# Patient Record
Sex: FEMALE | Race: WHITE | Employment: FULL TIME | URBAN - METROPOLITAN AREA
[De-identification: names, ages, dates, MRNs, and addresses within clinical notes are randomized per-mention and may not be internally consistent; named-entity substitution may affect disease eponyms.]

---

## 2021-11-07 ENCOUNTER — HOSPITAL ENCOUNTER (EMERGENCY)
Age: 26
Discharge: HOME | End: 2021-11-08
Attending: STUDENT IN AN ORGANIZED HEALTH CARE EDUCATION/TRAINING PROGRAM
Payer: COMMERCIAL

## 2021-11-07 ENCOUNTER — APPOINTMENT (OUTPATIENT)
Dept: CT IMAGING | Age: 26
End: 2021-11-07
Payer: COMMERCIAL

## 2021-11-07 DIAGNOSIS — M62.838 SPASM OF MUSCLE: Primary | ICD-10-CM

## 2021-11-07 LAB
ALBUMIN SERPL-MCNC: 4.7 GM/DL (ref 3.4–5)
ALP BLD-CCNC: 55 IU/L (ref 40–129)
ALT SERPL-CCNC: 18 U/L (ref 10–40)
ANION GAP SERPL CALCULATED.3IONS-SCNC: 11 MMOL/L (ref 4–16)
AST SERPL-CCNC: 14 IU/L (ref 15–37)
BACTERIA: ABNORMAL /HPF
BASOPHILS ABSOLUTE: 0 K/CU MM
BASOPHILS RELATIVE PERCENT: 0.4 % (ref 0–1)
BILIRUB SERPL-MCNC: 0.4 MG/DL (ref 0–1)
BILIRUBIN URINE: NEGATIVE MG/DL
BLOOD, URINE: NEGATIVE
BUN BLDV-MCNC: 14 MG/DL (ref 6–23)
CALCIUM SERPL-MCNC: 9.1 MG/DL (ref 8.3–10.6)
CHLORIDE BLD-SCNC: 104 MMOL/L (ref 99–110)
CLARITY: CLEAR
CO2: 24 MMOL/L (ref 21–32)
COLOR: YELLOW
CREAT SERPL-MCNC: 0.7 MG/DL (ref 0.6–1.1)
DIFFERENTIAL TYPE: ABNORMAL
EOSINOPHILS ABSOLUTE: 0.1 K/CU MM
EOSINOPHILS RELATIVE PERCENT: 1.1 % (ref 0–3)
GFR AFRICAN AMERICAN: >60 ML/MIN/1.73M2
GFR NON-AFRICAN AMERICAN: >60 ML/MIN/1.73M2
GLUCOSE BLD-MCNC: 120 MG/DL (ref 70–99)
GLUCOSE, URINE: NEGATIVE MG/DL
HCG QUALITATIVE: NEGATIVE
HCT VFR BLD CALC: 38.5 % (ref 37–47)
HEMOGLOBIN: 12.5 GM/DL (ref 12.5–16)
HYALINE CASTS: 0 /LPF
IMMATURE NEUTROPHIL %: 0.3 % (ref 0–0.43)
KETONES, URINE: NEGATIVE MG/DL
LEUKOCYTE ESTERASE, URINE: NEGATIVE
LIPASE: 31 IU/L (ref 13–60)
LYMPHOCYTES ABSOLUTE: 3.1 K/CU MM
LYMPHOCYTES RELATIVE PERCENT: 32.1 % (ref 24–44)
MCH RBC QN AUTO: 28.5 PG (ref 27–31)
MCHC RBC AUTO-ENTMCNC: 32.5 % (ref 32–36)
MCV RBC AUTO: 87.7 FL (ref 78–100)
MONOCYTES ABSOLUTE: 0.6 K/CU MM
MONOCYTES RELATIVE PERCENT: 6.3 % (ref 0–4)
MUCUS: ABNORMAL HPF
NITRITE URINE, QUANTITATIVE: NEGATIVE
NUCLEATED RBC %: 0 %
PDW BLD-RTO: 11.9 % (ref 11.7–14.9)
PH, URINE: 6 (ref 5–8)
PLATELET # BLD: 262 K/CU MM (ref 140–440)
PMV BLD AUTO: 9.2 FL (ref 7.5–11.1)
POTASSIUM SERPL-SCNC: 3.7 MMOL/L (ref 3.5–5.1)
PROTEIN UA: NEGATIVE MG/DL
RBC # BLD: 4.39 M/CU MM (ref 4.2–5.4)
RBC URINE: ABNORMAL /HPF (ref 0–6)
SEGMENTED NEUTROPHILS ABSOLUTE COUNT: 5.8 K/CU MM
SEGMENTED NEUTROPHILS RELATIVE PERCENT: 59.8 % (ref 36–66)
SODIUM BLD-SCNC: 139 MMOL/L (ref 135–145)
SPECIFIC GRAVITY UA: 1.01 (ref 1–1.03)
SQUAMOUS EPITHELIAL: 2 /HPF
TOTAL IMMATURE NEUTOROPHIL: 0.03 K/CU MM
TOTAL NUCLEATED RBC: 0 K/CU MM
TOTAL PROTEIN: 7.3 GM/DL (ref 6.4–8.2)
TRICHOMONAS: ABNORMAL /HPF
UROBILINOGEN, URINE: NEGATIVE MG/DL (ref 0.2–1)
WBC # BLD: 9.7 K/CU MM (ref 4–10.5)
WBC UA: <1 /HPF (ref 0–5)

## 2021-11-07 PROCEDURE — 80053 COMPREHEN METABOLIC PANEL: CPT

## 2021-11-07 PROCEDURE — 2580000003 HC RX 258: Performed by: STUDENT IN AN ORGANIZED HEALTH CARE EDUCATION/TRAINING PROGRAM

## 2021-11-07 PROCEDURE — 85025 COMPLETE CBC W/AUTO DIFF WBC: CPT

## 2021-11-07 PROCEDURE — 96374 THER/PROPH/DIAG INJ IV PUSH: CPT

## 2021-11-07 PROCEDURE — 6360000002 HC RX W HCPCS: Performed by: STUDENT IN AN ORGANIZED HEALTH CARE EDUCATION/TRAINING PROGRAM

## 2021-11-07 PROCEDURE — 74176 CT ABD & PELVIS W/O CONTRAST: CPT

## 2021-11-07 PROCEDURE — 83690 ASSAY OF LIPASE: CPT

## 2021-11-07 PROCEDURE — 96375 TX/PRO/DX INJ NEW DRUG ADDON: CPT

## 2021-11-07 PROCEDURE — 84703 CHORIONIC GONADOTROPIN ASSAY: CPT

## 2021-11-07 PROCEDURE — 99283 EMERGENCY DEPT VISIT LOW MDM: CPT

## 2021-11-07 PROCEDURE — 81001 URINALYSIS AUTO W/SCOPE: CPT

## 2021-11-07 RX ORDER — KETOROLAC TROMETHAMINE 30 MG/ML
15 INJECTION, SOLUTION INTRAMUSCULAR; INTRAVENOUS ONCE
Status: COMPLETED | OUTPATIENT
Start: 2021-11-07 | End: 2021-11-07

## 2021-11-07 RX ORDER — METHOCARBAMOL 100 MG/ML
1000 INJECTION, SOLUTION INTRAMUSCULAR; INTRAVENOUS ONCE
Status: COMPLETED | OUTPATIENT
Start: 2021-11-07 | End: 2021-11-07

## 2021-11-07 RX ORDER — LIDOCAINE 4 G/G
1 PATCH TOPICAL DAILY
Status: DISCONTINUED | OUTPATIENT
Start: 2021-11-08 | End: 2021-11-08 | Stop reason: HOSPADM

## 2021-11-07 RX ORDER — 0.9 % SODIUM CHLORIDE 0.9 %
500 INTRAVENOUS SOLUTION INTRAVENOUS ONCE
Status: COMPLETED | OUTPATIENT
Start: 2021-11-07 | End: 2021-11-07

## 2021-11-07 RX ADMIN — SODIUM CHLORIDE 500 ML: 9 INJECTION, SOLUTION INTRAVENOUS at 21:18

## 2021-11-07 RX ADMIN — METHOCARBAMOL 1000 MG: 1000 INJECTION, SOLUTION INTRAMUSCULAR; INTRAVENOUS at 21:47

## 2021-11-07 RX ADMIN — KETOROLAC TROMETHAMINE 15 MG: 30 INJECTION, SOLUTION INTRAMUSCULAR; INTRAVENOUS at 21:16

## 2021-11-07 ASSESSMENT — PAIN DESCRIPTION - LOCATION: LOCATION: BACK

## 2021-11-07 ASSESSMENT — PAIN SCALES - GENERAL
PAINLEVEL_OUTOF10: 0
PAINLEVEL_OUTOF10: 10
PAINLEVEL_OUTOF10: 10

## 2021-11-07 ASSESSMENT — PAIN DESCRIPTION - PAIN TYPE: TYPE: ACUTE PAIN

## 2021-11-08 VITALS
SYSTOLIC BLOOD PRESSURE: 121 MMHG | WEIGHT: 165 LBS | RESPIRATION RATE: 18 BRPM | DIASTOLIC BLOOD PRESSURE: 87 MMHG | TEMPERATURE: 98.1 F | HEIGHT: 64 IN | BODY MASS INDEX: 28.17 KG/M2 | HEART RATE: 71 BPM | OXYGEN SATURATION: 100 %

## 2021-11-08 RX ORDER — ACETAMINOPHEN 325 MG/1
650 TABLET ORAL EVERY 6 HOURS PRN
Qty: 120 TABLET | Refills: 3 | Status: SHIPPED | OUTPATIENT
Start: 2021-11-08

## 2021-11-08 RX ORDER — METHOCARBAMOL 500 MG/1
500 TABLET, FILM COATED ORAL 4 TIMES DAILY
Qty: 40 TABLET | Refills: 0 | Status: SHIPPED | OUTPATIENT
Start: 2021-11-08 | End: 2021-11-18

## 2021-11-08 RX ORDER — LIDOCAINE 4 G/G
1 PATCH TOPICAL DAILY
Qty: 30 PATCH | Refills: 0 | Status: SHIPPED | OUTPATIENT
Start: 2021-11-08 | End: 2021-12-08

## 2021-11-08 RX ORDER — IBUPROFEN 600 MG/1
600 TABLET ORAL EVERY 6 HOURS PRN
Qty: 120 TABLET | Refills: 0 | Status: SHIPPED | OUTPATIENT
Start: 2021-11-07

## 2021-11-08 NOTE — ED TRIAGE NOTES
Patient to ED with reports of lower back pain that started about 1 hour ago. Patient rates pain 10/10.

## 2021-11-08 NOTE — ED NOTES
CT ABDOMEN PELVIS WO CONTRAST Additional Contrast? None    Status: Final result       Order Providers    Authorizing Billing   MD Salud Tillman            Signed by    Signed Date/Time Phone Pager   Vazquez Anna 11/07/2021 10:39 [phone]      Reading Providers    Read Date Phone Pager   Jacinda Dates Nov 7, 2021 10:[phone]        CT ABDOMEN PELVIS WO CONTRAST Additional Contrast? None: Patient Communication     Released  Seen     Radiation Dose Estimates    No radiation information found for this patient  Narrative   EXAMINATION:   CT OF THE ABDOMEN AND PELVIS WITHOUT CONTRAST 11/7/2021 10:14 pm       TECHNIQUE:   CT of the abdomen and pelvis was performed without the administration of   intravenous contrast. Multiplanar reformatted images are provided for review.    Dose modulation, iterative reconstruction, and/or weight based adjustment of   the mA/kV was utilized to reduce the radiation dose to as low as reasonably   achievable.       COMPARISON:   10/15/2014       HISTORY:   ORDERING SYSTEM PROVIDED HISTORY: right back/flank pain   TECHNOLOGIST PROVIDED HISTORY:   Reason for exam:->right back/flank pain   Additional Contrast?->None   Decision Support Exception - unselect if not a suspected or confirmed   emergency medical condition->Emergency Medical Condition (MA)   Is the patient pregnant?->No   Reason for Exam: right back/flank pain   Acuity: Acute   Type of Exam: Initial       FINDINGS:   Lower Chest: The lung bases are clear with no consolidation or pleural   effusion.  Cardiac chambers are not enlarged.       Organs: Lack of IV contrast does reduce evaluation of the organs and   vasculature.  No apparent mass or focal deformity at the unenhanced liver,   spleen, or pancreas.  No pancreatic calcifications, ductal dilatation, or   focal fluid collection.  The gallbladder is contracted.  Adrenal glands are   not enlarged.       No renal calculi or hydronephrosis on either side.  The ureters are not   dilated and no ureteral calculi.       GI/Bowel: Food material is distending the stomach.  The small bowel and colon   are not dilated.  The appendix is identified and no appendicitis.  No focal   inflammation or fluid collection is noted along the colon.  There is no   ascites or pneumoperitoneum.       Pelvis: Urinary bladder is not fully distended for evaluation.  The uterus is   not enlarged.  Follicular changes are noted within the ovaries.       Peritoneum/Retroperitoneum: There is no abdominal aortic aneurysm or   retroperitoneal hematoma.       Bones/Soft Tissues: The body wall is unremarkable except for a metallic   umbilical piercing.  No fracture or destruction at the included bones. .           Impression   No renal calculi, hydronephrosis, or hydroureter.  No perinephric fat   stranding or fluid collection.       Food material distends the stomach.  No bowel obstruction, appendicitis, or   diverticulitis.              Elena Pradhan RN  11/08/21 0001

## 2021-11-15 NOTE — ED PROVIDER NOTES
Emergency Department Encounter    Patient: Gautam Owens  MRN: [id number]  : 1995  Date of Evaluation: 11/15/2021  ED Provider:  Regan Diaz MD    Triage Chief Complaint:   Back Pain (10/10, x1 hr)    [place]:  Gautam Owens is a 32 y.o. female presenting with lower back pain that started about an hour prior to presentation. Patient states she was sitting on a couch and acutely started having lower back pain. States the pain is severe, constant, stabbing/cramping, worse with movement without relieving factors. Patient states she has had no trauma no significant inciting event. Patient states she has no significant central spinal pain but pain diffusely over the lower back mostly on the right. Patient states she has had kidney stones in the past but has been a long time and does not know if this feels similar to prior. Denies any urinary symptoms including dysuria, increased frequency, urgency, hematuria. Denies nausea vomiting, diarrhea or constipation, blood or melena stools. Denies any vaginal bleeding or discharge. Denies anterior abdominal pain. States she has never felt similar pain in the past.  Patient denies any pain going into her lower extremities. Denies changes in bowel or bladder, motor or sensory changes in her lower extremity, saddle anesthesia, drug use, fever chills, recent infectious symptoms, cancer or immunosuppression. Patient denies any other symptoms including headache, blurred vision, focal neuro deficits, motor or sensory changes, chest pain or shortness of breath, cough or sputum production. ROS - see HPI, below listed is current ROS at time of my eval:  At least 10 point review of system reviewed, negative other HPI. Past Medical History:   Diagnosis Date    Allergic rhinitis     Kidney stone 10 15 14     No past surgical history on file.   Family History   Problem Relation Age of Onset    High Blood Pressure Mother      Social History     Socioeconomic History    Marital status: Single     Spouse name: Not on file    Number of children: Not on file    Years of education: Not on file    Highest education level: Not on file   Occupational History    Not on file   Tobacco Use    Smoking status: Never Smoker    Smokeless tobacco: Never Used   Substance and Sexual Activity    Alcohol use: No     Alcohol/week: 0.0 standard drinks    Drug use: No    Sexual activity: Yes     Partners: Male   Other Topics Concern    Not on file   Social History Narrative    Not on file     Social Determinants of Health     Financial Resource Strain:     Difficulty of Paying Living Expenses: Not on file   Food Insecurity:     Worried About Running Out of Food in the Last Year: Not on file    [place] of Food in the Last Year: Not on file   Transportation Needs:     Lack of Transportation (Medical): Not on file    Lack of Transportation (Non-Medical): Not on file   Physical Activity:     Days of Exercise per Week: Not on file    Minutes of Exercise per Session: Not on file   Stress:     Feeling of Stress : Not on file   Social Connections:     Frequency of Communication with Friends and Family: Not on file    Frequency of Social Gatherings with Friends and Family: Not on file    Attends [employer] Services: Not on file    Active Member of [address] [place] [address] [employer] or Organizations: Not on file    Attends Club or Organization Meetings: Not on file    Marital Status: Not on file   Intimate Partner Violence:     Fear of Current or Ex-Partner: Not on file    Emotionally Abused: Not on file    Physically Abused: Not on file    Sexually Abused: Not on file   Housing Stability:     Unable to Pay for Housing in the Last Year: Not on file    Number of [place] in the Last Year: Not on file    Unstable Housing in the Last Year: Not on file     No current facility-administered medications for this encounter.      Current Outpatient Medications   Medication Sig Dispense Refill    ibuprofen (IBU) 600 MG tablet Take 1 tablet by mouth every 6 hours as needed for Pain 120 tablet 0    acetaminophen (AMINOFEN) 325 MG tablet Take 2 tablets by mouth every 6 hours as needed for Pain 120 tablet 3    lidocaine 4 % external patch Place 1 patch onto the skin daily 30 patch 0    methocarbamol (ROBAXIN) 500 MG tablet Take 1 tablet by mouth 4 times daily for 10 days 40 tablet 0    clindamycin-benzoyl peroxide (BENZACLIN) 1-5 % gel Apply topically 2 times daily. 45 g 3    cetirizine (ZYRTEC ALLERGY) 10 MG tablet Take 1 tablet by mouth daily. 30 tablet 5    fluticasone (FLONASE) 50 MCG/ACT nasal spray 2 sprays by Nasal route daily. 1 Bottle 5     No Known Allergies    Nursing Notes Reviewed    Physical Exam:  Triage VS:    ED Triage Vitals [11/07/21 2021]   Enc Vitals Group      BP (!) 149/97      Pulse 88      Resp 22      Temp 97.6 °F (36.4 °C)      Temp Source Oral      SpO2 100 %      Weight 165 lb (74.8 kg)      Height 5' 4\" (1.626 m)      Head Circumference       Peak Flow       Pain Score       Pain Loc       Pain Edu? Excl. in [address]? My pulse ox interpretation is - normal    General appearance:  No acute distress. Skin:  Warm. Dry. Eye:  Extraocular movements intact. Ears, nose, mouth and throat:  Oral mucosa moist   Neck:  Trachea midline. Extremity:  No swelling. Normal ROM     Heart:  Regular rate and rhythm, normal S1 & S2, no extra heart sounds. Perfusion:  intact  Respiratory:  Lungs clear to auscultation bilaterally. Respirations nonlabored. Abdominal:  Normal bowel sounds. Soft. Nontender. Non distended.   Back:   no central tenderness palpation, no paraspinous tenderness palpation, tenderness palpation is in the musculature in the bilateral lower back, patient does have tenderness to palpation diffusely over the musculature, there is mild flank pain on the right as well, no tenderness to palpation over the anterior abdomen     Neurological:  Alert and oriented times 3. No focal neuro deficits.              Psychiatric:  Appropriate    I have reviewed and interpreted all of the currently available lab results from this visit (if applicable):  Results for orders placed or performed during the hospital encounter of 11/07/21   CBC auto diff   Result Value Ref Range    WBC 9.7 4.0 - 10.5 K/CU MM    RBC 4.39 4.2 - 5.4 M/CU MM    Hemoglobin 12.5 12.5 - 16.0 GM/DL    Hematocrit 38.5 37 - 47 %    MCV 87.7 78 - 100 FL    MCH 28.5 27 - 31 PG    MCHC 32.5 32.0 - 36.0 %    RDW 11.9 11.7 - 14.9 %    Platelets 558 620 - 899 K/CU MM    MPV 9.2 7.5 - 11.1 FL    Differential Type AUTOMATED DIFFERENTIAL     Segs Relative 59.8 36 - 66 %    Lymphocytes % 32.1 24 - 44 %    Monocytes % 6.3 (H) 0 - 4 %    Eosinophils % 1.1 0 - 3 %    Basophils % 0.4 0 - 1 %    Segs Absolute 5.8 K/CU MM    Lymphocytes Absolute 3.1 K/CU MM    Monocytes Absolute 0.6 K/CU MM    Eosinophils Absolute 0.1 K/CU MM    Basophils Absolute 0.0 K/CU MM    Nucleated RBC % 0.0 %    Total Nucleated RBC 0.0 K/CU MM    Total Immature Neutrophil 0.03 K/CU MM    Immature Neutrophil % 0.3 0 - 0.43 %   CMP   Result Value Ref Range    Sodium 139 135 - 145 MMOL/L    Potassium 3.7 3.5 - 5.1 MMOL/L    Chloride 104 99 - 110 mMol/L    CO2 24 21 - 32 MMOL/L    BUN 14 6 - 23 MG/DL    CREATININE 0.7 0.6 - 1.1 MG/DL    Glucose 120 (H) 70 - 99 MG/DL    Calcium 9.1 8.3 - 10.6 MG/DL    Albumin 4.7 3.4 - 5.0 GM/DL    Total Protein 7.3 6.4 - 8.2 GM/DL    Total Bilirubin 0.4 0.0 - 1.0 MG/DL    ALT 18 10 - 40 U/L    AST 14 (L) 15 - 37 IU/L    Alkaline Phosphatase 55 40 - 129 IU/L    GFR Non-African American >60 >60 mL/min/1.73m2    GFR African American >60 >60 mL/min/1.73m2    Anion Gap 11 4 - 16   Lipase   Result Value Ref Range    Lipase 31 13 - 60 IU/L   Urinalysis   Result Value Ref Range    Color, UA YELLOW YELLOW    Clarity, UA CLEAR CLEAR    Glucose, Urine NEGATIVE NEGATIVE MG/DL    Bilirubin Urine NEGATIVE NEGATIVE MG/DL    Ketones, Urine NEGATIVE NEGATIVE MG/DL    Specific Gravity, UA 1.013 1.001 - 1.035    Blood, Urine NEGATIVE NEGATIVE    pH, Urine 6.0 5.0 - 8.0    Protein, UA NEGATIVE NEGATIVE MG/DL    Urobilinogen, Urine NEGATIVE 0.2 - 1.0 MG/DL    Nitrite Urine, Quantitative NEGATIVE NEGATIVE    Leukocyte Esterase, Urine NEGATIVE NEGATIVE    RBC, UA NONE SEEN 0 - 6 /HPF    WBC, UA <1 0 - 5 /HPF    Bacteria, UA OCCASIONAL (A) NEGATIVE /HPF    Squam Epithel, UA 2 /HPF    Mucus, UA RARE (A) NEGATIVE HPF    Trichomonas, UA NONE SEEN NONE SEEN /HPF    Hyaline Casts, UA 0 /LPF   HCG Serum, Qualitative   Result Value Ref Range    hCG Qual NEGATIVE       Radiographs (if obtained):  Radiologist's Report Reviewed:  CT ABDOMEN PELVIS WO CONTRAST Additional Contrast? None    Result Date: 11/7/2021  EXAMINATION: CT OF THE ABDOMEN AND PELVIS WITHOUT CONTRAST 11/7/2021 10:14 pm TECHNIQUE: CT of the abdomen and pelvis was performed without the administration of intravenous contrast. Multiplanar reformatted images are provided for review. Dose modulation, iterative reconstruction, and/or weight based adjustment of the mA/kV was utilized to reduce the radiation dose to as low as reasonably achievable. COMPARISON: 10/15/2014 HISTORY: ORDERING SYSTEM PROVIDED HISTORY: right back/flank pain TECHNOLOGIST PROVIDED HISTORY: Reason for exam:->right back/flank pain Additional Contrast?->None Decision Support Exception - unselect if not a suspected or confirmed emergency medical condition->Emergency Medical Condition (MA) Is the patient pregnant?->No Reason for Exam: right back/flank pain Acuity: Acute Type of Exam: Initial FINDINGS: Lower Chest: The lung bases are clear with no consolidation or pleural effusion. Cardiac chambers are not enlarged. Organs: Lack of IV contrast does reduce evaluation of the organs and vasculature. No apparent mass or focal deformity at the unenhanced liver, spleen, or pancreas.   No pancreatic calcifications, ductal dilatation, or focal fluid collection. The gallbladder is contracted. Adrenal glands are not enlarged. No renal calculi or hydronephrosis on either side. The ureters are not dilated and no ureteral calculi. GI/Bowel: Food material is distending the stomach. The small bowel and colon are not dilated. The appendix is identified and no appendicitis. No focal inflammation or fluid collection is noted along the colon. There is no ascites or pneumoperitoneum. Pelvis: Urinary bladder is not fully distended for evaluation. The uterus is not enlarged. Follicular changes are noted within the ovaries. Peritoneum/Retroperitoneum: There is no abdominal aortic aneurysm or retroperitoneal hematoma. Bones/Soft Tissues: The body wall is unremarkable except for a metallic umbilical piercing. No fracture or destruction at the included bones. .     No renal calculi, hydronephrosis, or hydroureter. No perinephric fat stranding or fluid collection. Food material distends the stomach. No bowel obstruction, appendicitis, or diverticulitis. MDM:    51-year-old female presenting with acute onset of lower back pain. History can be seen above. Vitals on presentation reassuring and patient afebrile satting well on room air. Physical exam patient is very uncomfortable appearing on initial evaluation. Patient has no tenderness to palpation along the central spine the pain is along the musculature of the bilateral lower back, there is negative straight leg raise, normal sensation light touch in the lower extremities, 5 out of 5 motor strength in bilateral lower extremities. Patient has no red flags at this time. Patient does have a history of kidney stones so further work-up was obtained. CBC, CMP, lipase are reassuring. Pregnancy screen is negative. UA shows occasional bacteria but otherwise unremarkable without increased white count. Patient denies any urinary symptoms at this time do not believe further treatment is necessary.   CT without contrast was obtained showing no renal calculi, hydronephrosis, hydroureter with no perinephric fat stranding or fluid collection. Patient was given Toradol, Robaxin and fluids in the ED. On reevaluation patient states her pain is totally gone. Patient is walking around the room without difficulty and is very well-appearing. Discussed with patient this likely muscular in nature and will will treat with Motrin, Tylenol and Robaxin as well as Lidoderm patches outpatient. Discussed strict return precautions with patient as well as follow-up with primary care physician patient agreeable plan and discharge home. Clinical Impression:  1. Spasm of muscle      Disposition referral (if applicable):  Yandel Bland, APRN - CNP  [address]  [phone]    In 2 days      [facility] Emergency Department  [facility] [phone]    If symptoms worsen    Disposition medications (if applicable):  Discharge Medication List as of 11/8/2021 12:39 AM      START taking these medications    Details   acetaminophen (AMINOFEN) 325 MG tablet Take 2 tablets by mouth every 6 hours as needed for Pain, Disp-120 tablet, R-3Normal      lidocaine 4 % external patch Place 1 patch onto the skin daily, TransDERmal, DAILY Starting Mon 11/8/2021, Until Wed 12/8/2021, For 30 days, Disp-30 patch, R-0, Normal      methocarbamol (ROBAXIN) 500 MG tablet Take 1 tablet by mouth 4 times daily for 10 days, Disp-40 tablet, R-0Normal           ED Provider Disposition Time  DISPOSITION Decision To Discharge 11/08/2021 12:35:06 AM      Comment: Please note this report has been produced using speech recognition software and may contain errors related to that system including errors in grammar, punctuation, and spelling, as well as words and phrases that may be inappropriate. Efforts were made to edit the dictations.         Tonya Villaseñor Tahir Hinton MD  11/15/21 0893